# Patient Record
Sex: FEMALE | Race: BLACK OR AFRICAN AMERICAN | Employment: FULL TIME | URBAN - NONMETROPOLITAN AREA
[De-identification: names, ages, dates, MRNs, and addresses within clinical notes are randomized per-mention and may not be internally consistent; named-entity substitution may affect disease eponyms.]

---

## 2023-07-27 ENCOUNTER — HOSPITAL ENCOUNTER (INPATIENT)
Age: 43
LOS: 3 days | Discharge: HOME | DRG: 885 | End: 2023-07-30
Attending: PSYCHIATRY & NEUROLOGY | Admitting: PSYCHIATRY & NEUROLOGY

## 2023-07-27 DIAGNOSIS — F41.0 PANIC DISORDER: Primary | ICD-10-CM

## 2023-07-27 PROBLEM — F32.9 MAJOR DEPRESSIVE DISORDER, SINGLE EPISODE: Status: ACTIVE | Noted: 2023-07-27

## 2023-07-27 PROCEDURE — 6370000000 HC RX 637 (ALT 250 FOR IP): Performed by: PSYCHIATRY & NEUROLOGY

## 2023-07-27 PROCEDURE — 6360000002 HC RX W HCPCS: Performed by: PHYSICIAN ASSISTANT

## 2023-07-27 PROCEDURE — 1240000000 HC EMOTIONAL WELLNESS R&B

## 2023-07-27 PROCEDURE — 90792 PSYCH DIAG EVAL W/MED SRVCS: CPT | Performed by: PSYCHIATRY & NEUROLOGY

## 2023-07-27 PROCEDURE — 6370000000 HC RX 637 (ALT 250 FOR IP): Performed by: PHYSICIAN ASSISTANT

## 2023-07-27 RX ORDER — ONDANSETRON 4 MG/1
8 TABLET, ORALLY DISINTEGRATING ORAL EVERY 8 HOURS PRN
Status: DISCONTINUED | OUTPATIENT
Start: 2023-07-27 | End: 2023-07-30 | Stop reason: HOSPADM

## 2023-07-27 RX ORDER — IBUPROFEN 400 MG/1
400 TABLET ORAL EVERY 6 HOURS PRN
Status: DISCONTINUED | OUTPATIENT
Start: 2023-07-27 | End: 2023-07-30 | Stop reason: HOSPADM

## 2023-07-27 RX ORDER — BUSPIRONE HYDROCHLORIDE 15 MG/1
15 TABLET ORAL 3 TIMES DAILY
Status: ON HOLD | COMMUNITY
End: 2023-07-30 | Stop reason: HOSPADM

## 2023-07-27 RX ORDER — ACETAMINOPHEN 325 MG/1
650 TABLET ORAL EVERY 4 HOURS PRN
Status: DISCONTINUED | OUTPATIENT
Start: 2023-07-27 | End: 2023-07-30 | Stop reason: HOSPADM

## 2023-07-27 RX ORDER — MAGNESIUM HYDROXIDE/ALUMINUM HYDROXICE/SIMETHICONE 120; 1200; 1200 MG/30ML; MG/30ML; MG/30ML
30 SUSPENSION ORAL EVERY 6 HOURS PRN
Status: DISCONTINUED | OUTPATIENT
Start: 2023-07-27 | End: 2023-07-30 | Stop reason: HOSPADM

## 2023-07-27 RX ORDER — OXYCODONE HYDROCHLORIDE 5 MG/1
10 TABLET ORAL EVERY 6 HOURS PRN
Status: DISCONTINUED | OUTPATIENT
Start: 2023-07-27 | End: 2023-07-30 | Stop reason: HOSPADM

## 2023-07-27 RX ORDER — HYDROXYZINE HYDROCHLORIDE 25 MG/1
50 TABLET, FILM COATED ORAL 3 TIMES DAILY PRN
Status: DISCONTINUED | OUTPATIENT
Start: 2023-07-27 | End: 2023-07-30 | Stop reason: HOSPADM

## 2023-07-27 RX ORDER — PROMETHAZINE HYDROCHLORIDE 25 MG/ML
6.25 INJECTION, SOLUTION INTRAMUSCULAR; INTRAVENOUS EVERY 6 HOURS PRN
Status: DISCONTINUED | OUTPATIENT
Start: 2023-07-27 | End: 2023-07-30 | Stop reason: HOSPADM

## 2023-07-27 RX ORDER — BUPROPION HYDROCHLORIDE 150 MG/1
150 TABLET ORAL DAILY
Status: DISCONTINUED | OUTPATIENT
Start: 2023-07-27 | End: 2023-07-30 | Stop reason: HOSPADM

## 2023-07-27 RX ORDER — CLONAZEPAM 1 MG/1
1 TABLET ORAL EVERY 12 HOURS
Status: DISCONTINUED | OUTPATIENT
Start: 2023-07-27 | End: 2023-07-30 | Stop reason: HOSPADM

## 2023-07-27 RX ORDER — TRAZODONE HYDROCHLORIDE 50 MG/1
50 TABLET ORAL NIGHTLY PRN
Status: DISCONTINUED | OUTPATIENT
Start: 2023-07-27 | End: 2023-07-30 | Stop reason: HOSPADM

## 2023-07-27 RX ORDER — BUPRENORPHINE HYDROCHLORIDE 300 UG/1
300 FILM, SOLUBLE BUCCAL EVERY 12 HOURS
Status: ON HOLD | COMMUNITY
End: 2023-07-30 | Stop reason: HOSPADM

## 2023-07-27 RX ADMIN — OXYCODONE HYDROCHLORIDE 10 MG: 5 TABLET ORAL at 10:20

## 2023-07-27 RX ADMIN — PROMETHAZINE HYDROCHLORIDE 6.25 MG: 25 INJECTION INTRAMUSCULAR; INTRAVENOUS at 08:29

## 2023-07-27 RX ADMIN — CLONAZEPAM 1 MG: 1 TABLET ORAL at 10:20

## 2023-07-27 RX ADMIN — OXYCODONE HYDROCHLORIDE 10 MG: 5 TABLET ORAL at 21:44

## 2023-07-27 RX ADMIN — ONDANSETRON 8 MG: 4 TABLET, ORALLY DISINTEGRATING ORAL at 06:50

## 2023-07-27 RX ADMIN — CLONAZEPAM 1 MG: 1 TABLET ORAL at 21:44

## 2023-07-27 RX ADMIN — OXYCODONE HYDROCHLORIDE 10 MG: 5 TABLET ORAL at 16:43

## 2023-07-27 RX ADMIN — BUPROPION HYDROCHLORIDE 150 MG: 150 TABLET, FILM COATED, EXTENDED RELEASE ORAL at 10:20

## 2023-07-27 ASSESSMENT — PAIN DESCRIPTION - DESCRIPTORS
DESCRIPTORS: ACHING
DESCRIPTORS: ACHING

## 2023-07-27 ASSESSMENT — LIFESTYLE VARIABLES: HOW OFTEN DO YOU HAVE A DRINK CONTAINING ALCOHOL: NEVER

## 2023-07-27 ASSESSMENT — PAIN DESCRIPTION - LOCATION
LOCATION: BACK

## 2023-07-27 ASSESSMENT — PAIN DESCRIPTION - ORIENTATION
ORIENTATION: MID;LOWER
ORIENTATION: MID;LOWER

## 2023-07-27 ASSESSMENT — PAIN DESCRIPTION - PAIN TYPE
TYPE: CHRONIC PAIN
TYPE: CHRONIC PAIN

## 2023-07-27 ASSESSMENT — PAIN SCALES - GENERAL
PAINLEVEL_OUTOF10: 6
PAINLEVEL_OUTOF10: 7
PAINLEVEL_OUTOF10: 4
PAINLEVEL_OUTOF10: 3
PAINLEVEL_OUTOF10: 6

## 2023-07-27 ASSESSMENT — PAIN DESCRIPTION - ONSET: ONSET: GRADUAL

## 2023-07-27 ASSESSMENT — PAIN - FUNCTIONAL ASSESSMENT
PAIN_FUNCTIONAL_ASSESSMENT: ACTIVITIES ARE NOT PREVENTED
PAIN_FUNCTIONAL_ASSESSMENT: ACTIVITIES ARE NOT PREVENTED

## 2023-07-27 ASSESSMENT — PAIN DESCRIPTION - FREQUENCY: FREQUENCY: INTERMITTENT

## 2023-07-27 NOTE — BH NOTE
Behavioral Health   Admission Note   Admission Type: Involuntary    Reason for Admission: MDD    Patient Strengths/Barriers  Strengths (Must Choose Two): Support from family  Barriers: Support from family    Addictive Behavior  In the Past 3 Months, Have You Felt or Has Someone Told You That You Have a Problem With  : None    Medical Problems:   Past Medical History:   Diagnosis Date    Psychiatric problem        Status EXAM:  Mental Status and Behavioral Exam  Normal: No  Level of Assistance: Independent/Self  Facial Expression: Flat  Affect: Blunt  Level of Consciousness: Alert  Frequency of Checks: 4 times per hour, close  Mood:Normal: No  Mood: Anxious, Irritable  Eye Contact: Fair  Observed Behavior: Withdrawn  Sexual Misconduct History: Current - no  Preception: [place] to person, [place] to time, [place] to place, [place] to situation  Attention:Normal: Yes  Thought Processes: Circumstantial  Thought Content:Normal: Yes  Depression Symptoms: Appetite change, Change in energy level, Loss of interest, Isolative  Anxiety Symptoms: Generalized  Aleksandra Symptoms: No problems reported or observed. Hallucinations: None  Delusions: No  Memory:Normal: Yes  Insight and Judgment: No  Insight and Judgment: Poor judgment, Poor insight, Unmotivated    Pt admitted with followings belongings:  Dental Appliances: None  Vision - Corrective Lenses: None  Hearing Aid: None  Jewelry: Bracelet, Necklace, Earrings  Body Piercings Removed: N/A  Clothing: Footwear, Shirt, Shorts, Undergarments  Other Valuables: Purse, [address], Cigarettes     Admission order obtained Yes  . Patient's home medications were locked up #[id number]. Patient oriented to surroundings and program expectations and copy of patient rights given.  Received admission packet:  Yes  Consents reviewed, signed No. Outcomes Questionnaire completed {Responses; yes/no/refused/notapproropriate:No.  Jovan Panda Important Message from [employer] About Your Rights\" form reviewed, signed:

## 2023-07-27 NOTE — BH NOTE
Patient has had 1 large and 1 small emesis of yellow  bile. Patient states she has not eaten so she tried some maria crackers but that did not help.  Dr. Royer Tapia was called for orders to help with her nausea

## 2023-07-27 NOTE — PATIENT CARE CONFERENCE
[facility]  Initial Interdisciplinary Treatment Plan NOTE    REVIEW DATE AND TIME: 7/27/23 9:45    PATIENT was IN TREATMENT TEAM.  See Multidisciplinary Treatment Team sheet for participants. ADMISSION TYPE:   Admission Type: Involuntary    REASON FOR ADMISSION:  Reason for Admission: MDD      Estimated Length of Stay Update:  3-5 days  Estimated Discharge Date Update: 3-5 days    Patient Strengths/Barriers  Strengths (Must Choose Two): Support from family  Barriers: Support from family  Addictive Behavior:Addictive Behavior  In the Past 3 Months, Have You Felt or Has Someone Told You That You Have a Problem With  : None  Medical Problems:  Past Medical History:   Diagnosis Date    Psychiatric problem        EDUCATION:   Learner Progress Toward Treatment Goals: Reviewed results and recommendations of this team, Reviewed group plan and strategies, Reviewed signs, symptoms and risk of self harm and violent behavior, and Reviewed goals and plan of care    Method: Individual    Outcome: Verbalized understanding and Demonstrated Understanding    PATIENT GOALS: \"Helping me I guess. .. resources\". OQ TOP QUALITY PRIORITIES FOR THE PATIENT AS IDENTIFIED ON ADMISSION ADMINISTRATION:        ND    PLAN/TREATMENT RECOMMENDATIONS UPDATE:   What is the most important thing we can help you with while you are here? See above   Who is your support system? Friends  Do you have follow-up providers? Yes, Dr. Micehl Ramirez out of [place] (psychiatrist). Pt states she still should be able to see them virtually from [place]. Do you have the ability to pay for your medications? \"Not right now\"  Where will you be residing when you leave the hospital? Pt states her travel contract will provide housing in [place]. Will need a return to work slip or FMLA paper completion? Yes.       GOALS UPDATE:   Time frame for Short-Term Goals: Daily    GORDON Nowak

## 2023-07-27 NOTE — BH NOTE
INPATIENT RECREATIONAL THERAPY  ADULT BEHAVIORAL SERVICES  EVALUATION    REFERRING PHYSICIAN:   Dr. Jason Isbell  DIAGNOSIS:   Major Depressive Disorder, Single Episode  PRECAUTIONS:   Standard precautions    HISTORY OF PRESENT ILLNESS/INJURY:   Patient was admitted to the unit due to suicidal ideation and depression. Patient reported stress due to the [employer] truck she rented breaking down on her way from [place] to [place]. Patient stated that she went to [facility] for some help. Patient did not get the help she wanted so she left her children in the ED at [facility] and reported to the ED there she had taken a half of a bottle of buspar. Patient now denies all and is irritable about her admission to this unit. Patient reported that she ran out of her prescribed medications so she has been noncompliant. PMH:  Please see medical chart for prior medical history, allergies, and medication    HISTORY OF PSYCHIATRIC TREATMENT:  OP:  PCP    DATE OF BIRTH:   [birth year]  GENDER:   female  MARITAL STATUS:   with 2 teenaged daughters. Patient reported CPS took her children from her when she left them in the ED and took a walk to take the overdose. EMPLOYMENT STATUS:  Employed as an LPN. LIVING SITUATION/SUPPORT:  Patient was in [place] but on route back to [place] at this time. EDUCATIONAL LEVEL:  Patient has an LPN degree. MEDICATION/DRUG USE:  Noncompliance with medications    LEISURE INTERESTS:  spiritual activities, activities with her children/family, listening to music, activities with friends  ACTIVITY PREFERENCE:  Individual  ACTIVITY TYPES:  Passive. Indoor. Outdoor. Active. COGNITION:   A&Ox4    COPING:   poor  ATTENTION:  fair  RELAXATION:  Patient reported anxiety and a poor appetite.   SELF-ESTEEM:   poor  MOTIVATION:   poor - poor insight    SOCIAL SKILLS:  fair - isolating in her room  FRUSTRATION TOLERANCE:    poor   ATTENTION SEEKING:  isolating in her room  COOPERATION:

## 2023-07-27 NOTE — H&P
Department of Psychiatry  Attending Physician Psychiatric Assessment     Reason for Admission to Psychiatric Unit:  Threat to self requiring 24 hour professional observation  Concerns about patient's safety in the community    CHIEF COMPLAINT:  MDD with suicidal ideation    History obtained from:  patient, electronic medical record and family members    HISTORY OF PRESENT ILLNESS:    Rajwinder Soliman is a 44 y.o. female with significant past psychiatric history of MDD and anxiety who presented to [facility] ED and was placed on an [facility] for Overdose. Per Admission summary: Patient arrived to the unit on a cart by squad from [facility] under an [facility] that was obtained after the patient told a doctor in the ER at [facility] she had overdosed. Patient reports that she is originally from [place] but ha lived in [place] for the last 8 years, and is an LPN. States she has got a contract and was on her way back to [place] to work, when [place] broke down. Patient states she was at [facility] wanting help and was not getting any and she was getting frustrated so she left for a walk and left her kids in the ER (they are 15 and 16 and she stated they are old to take care of them selves). She states she took a few extra Buspar but not half the bottle but when she returned she told the doctor she had taken half the bottle, she was unaware that some sort of help had arrived but it was to late because the [facility] placed an [facility] on her for OD and CPS took her children. Patient denies that she was suicidal, states that she was needing help and no one would help. Patient is irritable, states she is not staying past the 3 days. Today on Interview,   Patient admitted to \"bad stuff going on\" in her life lately which led her to lie in order to obtain help from [facility] ED. She described living in [place] for the past 8 years on a nursing contract with her two daughters, 15and 16years old.  Recently, the roof caved in and the air conditioning broke
within normal limits

## 2023-07-28 PROBLEM — F33.2 MDD (MAJOR DEPRESSIVE DISORDER), RECURRENT SEVERE, WITHOUT PSYCHOSIS (HCC): Status: ACTIVE | Noted: 2023-07-27

## 2023-07-28 PROCEDURE — 99232 SBSQ HOSP IP/OBS MODERATE 35: CPT | Performed by: PSYCHIATRY & NEUROLOGY

## 2023-07-28 PROCEDURE — 6370000000 HC RX 637 (ALT 250 FOR IP): Performed by: PSYCHIATRY & NEUROLOGY

## 2023-07-28 PROCEDURE — 1240000000 HC EMOTIONAL WELLNESS R&B

## 2023-07-28 PROCEDURE — 6370000000 HC RX 637 (ALT 250 FOR IP): Performed by: PHYSICIAN ASSISTANT

## 2023-07-28 RX ADMIN — TRAZODONE HYDROCHLORIDE 50 MG: 50 TABLET ORAL at 20:25

## 2023-07-28 RX ADMIN — BUPROPION HYDROCHLORIDE 150 MG: 150 TABLET, FILM COATED, EXTENDED RELEASE ORAL at 08:39

## 2023-07-28 RX ADMIN — ACETAMINOPHEN 650 MG: 325 TABLET ORAL at 06:58

## 2023-07-28 RX ADMIN — CLONAZEPAM 1 MG: 1 TABLET ORAL at 20:25

## 2023-07-28 RX ADMIN — CLONAZEPAM 1 MG: 1 TABLET ORAL at 08:39

## 2023-07-28 RX ADMIN — OXYCODONE HYDROCHLORIDE 10 MG: 5 TABLET ORAL at 22:24

## 2023-07-28 RX ADMIN — OXYCODONE HYDROCHLORIDE 10 MG: 5 TABLET ORAL at 03:39

## 2023-07-28 RX ADMIN — ONDANSETRON 8 MG: 4 TABLET, ORALLY DISINTEGRATING ORAL at 08:56

## 2023-07-28 RX ADMIN — OXYCODONE HYDROCHLORIDE 10 MG: 5 TABLET ORAL at 16:19

## 2023-07-28 RX ADMIN — IBUPROFEN 400 MG: 400 TABLET, FILM COATED ORAL at 21:23

## 2023-07-28 RX ADMIN — IBUPROFEN 400 MG: 400 TABLET, FILM COATED ORAL at 13:57

## 2023-07-28 RX ADMIN — OXYCODONE HYDROCHLORIDE 10 MG: 5 TABLET ORAL at 09:42

## 2023-07-28 ASSESSMENT — PAIN SCALES - GENERAL
PAINLEVEL_OUTOF10: 5
PAINLEVEL_OUTOF10: 8
PAINLEVEL_OUTOF10: 7
PAINLEVEL_OUTOF10: 4
PAINLEVEL_OUTOF10: 3
PAINLEVEL_OUTOF10: 3
PAINLEVEL_OUTOF10: 8
PAINLEVEL_OUTOF10: 0
PAINLEVEL_OUTOF10: 5
PAINLEVEL_OUTOF10: 7
PAINLEVEL_OUTOF10: 6
PAINLEVEL_OUTOF10: 0

## 2023-07-28 ASSESSMENT — PAIN DESCRIPTION - FREQUENCY
FREQUENCY: INTERMITTENT

## 2023-07-28 ASSESSMENT — PAIN DESCRIPTION - ORIENTATION
ORIENTATION: RIGHT;LEFT
ORIENTATION: ANTERIOR
ORIENTATION: ANTERIOR
ORIENTATION: LOWER
ORIENTATION: UPPER

## 2023-07-28 ASSESSMENT — PAIN - FUNCTIONAL ASSESSMENT
PAIN_FUNCTIONAL_ASSESSMENT: ACTIVITIES ARE NOT PREVENTED

## 2023-07-28 ASSESSMENT — PAIN DESCRIPTION - DESCRIPTORS
DESCRIPTORS: ACHING
DESCRIPTORS: CRAMPING
DESCRIPTORS: ACHING

## 2023-07-28 ASSESSMENT — PAIN DESCRIPTION - LOCATION
LOCATION: BACK
LOCATION: ABDOMEN
LOCATION: SHOULDER
LOCATION: BACK
LOCATION: BACK
LOCATION: ABDOMEN
LOCATION: ABDOMEN

## 2023-07-28 ASSESSMENT — PAIN DESCRIPTION - PAIN TYPE
TYPE: ACUTE PAIN

## 2023-07-28 ASSESSMENT — PAIN DESCRIPTION - ONSET
ONSET: GRADUAL

## 2023-07-28 NOTE — BH NOTE
1930 - patient irritable with assessment, acting as though she was being \"bothered\". She became irritable when she asked to sit up because she was laying in bed and talking through her pillow, after she woke up. She sat up for her vital signs and then laid face down again. Patient stated that she is planning to go to [place] but states that is does not matter with who because she is not staying and she is not following with no one. Patient denies pain at this time, she is laying bed asking when she is going to her medication that was scheduled. Patient was informed that they are not due. 2022 - Patient at nurses station asking for her medication and informed that it Is still not time for her medications, patient upset but returned to bed. Patient is very dismissive of staff. 2144 - After being aroused multiple times for medication administration but did come out for her medication. Patient states her pain is only a 3 and she has low anxiety.

## 2023-07-28 NOTE — BH NOTE
PLAN OF CARE:     Start Time: 0900  End Time:  0930    Group Topic:  Daily Goals    Group Type:   Goal Group    Intervention/Goal:  To increase support and identify daily goals    Attendance:  attended group      Affect:    congruent    Behavior:  cooperative and pleasant    Response:  appropriate    Daily Goal:  \"Come to groups today. \"    Progress:   progressing to goal 5

## 2023-07-29 PROCEDURE — 6370000000 HC RX 637 (ALT 250 FOR IP): Performed by: PSYCHIATRY & NEUROLOGY

## 2023-07-29 PROCEDURE — 6370000000 HC RX 637 (ALT 250 FOR IP): Performed by: PHYSICIAN ASSISTANT

## 2023-07-29 PROCEDURE — 1240000000 HC EMOTIONAL WELLNESS R&B

## 2023-07-29 PROCEDURE — 99231 SBSQ HOSP IP/OBS SF/LOW 25: CPT | Performed by: PSYCHIATRY & NEUROLOGY

## 2023-07-29 RX ADMIN — OXYCODONE HYDROCHLORIDE 10 MG: 5 TABLET ORAL at 12:14

## 2023-07-29 RX ADMIN — OXYCODONE HYDROCHLORIDE 10 MG: 5 TABLET ORAL at 06:06

## 2023-07-29 RX ADMIN — HYDROXYZINE HYDROCHLORIDE 50 MG: 25 TABLET, FILM COATED ORAL at 05:27

## 2023-07-29 RX ADMIN — CLONAZEPAM 1 MG: 1 TABLET ORAL at 20:37

## 2023-07-29 RX ADMIN — TRAZODONE HYDROCHLORIDE 50 MG: 50 TABLET ORAL at 20:37

## 2023-07-29 RX ADMIN — CLONAZEPAM 1 MG: 1 TABLET ORAL at 09:21

## 2023-07-29 RX ADMIN — BUPROPION HYDROCHLORIDE 150 MG: 150 TABLET, FILM COATED, EXTENDED RELEASE ORAL at 09:21

## 2023-07-29 RX ADMIN — HYDROXYZINE HYDROCHLORIDE 50 MG: 25 TABLET, FILM COATED ORAL at 13:37

## 2023-07-29 RX ADMIN — IBUPROFEN 400 MG: 400 TABLET, FILM COATED ORAL at 21:19

## 2023-07-29 RX ADMIN — OXYCODONE HYDROCHLORIDE 10 MG: 5 TABLET ORAL at 18:07

## 2023-07-29 RX ADMIN — IBUPROFEN 400 MG: 400 TABLET, FILM COATED ORAL at 09:26

## 2023-07-29 ASSESSMENT — PAIN SCALES - GENERAL
PAINLEVEL_OUTOF10: 2
PAINLEVEL_OUTOF10: 7
PAINLEVEL_OUTOF10: 8
PAINLEVEL_OUTOF10: 0
PAINLEVEL_OUTOF10: 8
PAINLEVEL_OUTOF10: 4
PAINLEVEL_OUTOF10: 0
PAINLEVEL_OUTOF10: 7
PAINLEVEL_OUTOF10: 0
PAINLEVEL_OUTOF10: 4
PAINLEVEL_OUTOF10: 0

## 2023-07-29 ASSESSMENT — PAIN DESCRIPTION - ONSET
ONSET: ON-GOING
ONSET: ON-GOING

## 2023-07-29 ASSESSMENT — PAIN DESCRIPTION - LOCATION
LOCATION: ABDOMEN

## 2023-07-29 ASSESSMENT — PAIN DESCRIPTION - PAIN TYPE
TYPE: ACUTE PAIN
TYPE: ACUTE PAIN

## 2023-07-29 ASSESSMENT — PAIN DESCRIPTION - DESCRIPTORS
DESCRIPTORS: ACHING
DESCRIPTORS: CRAMPING;ACHING
DESCRIPTORS: CRAMPING
DESCRIPTORS: ACHING;CRAMPING
DESCRIPTORS: ACHING;CRAMPING

## 2023-07-29 ASSESSMENT — PAIN DESCRIPTION - FREQUENCY
FREQUENCY: INTERMITTENT
FREQUENCY: INTERMITTENT

## 2023-07-29 ASSESSMENT — PAIN - FUNCTIONAL ASSESSMENT
PAIN_FUNCTIONAL_ASSESSMENT: ACTIVITIES ARE NOT PREVENTED

## 2023-07-29 ASSESSMENT — PAIN DESCRIPTION - ORIENTATION
ORIENTATION: ANTERIOR
ORIENTATION: ANTERIOR
ORIENTATION: MID

## 2023-07-29 ASSESSMENT — PAIN SCALES - WONG BAKER
WONGBAKER_NUMERICALRESPONSE: 0
WONGBAKER_NUMERICALRESPONSE: 0

## 2023-07-29 NOTE — GROUP NOTE
Group Therapy Note    Date: 2023    Group Start Time: 1100  Group End Time:   Group Topic: [facility] Adult Psych 4E    Wilhelmena Medicus, CTRS        Group Therapy Note    Attendees: 6       Patient's Goal:  To keep coming to the groups. Notes:  Pt initially is standoffish in group and irritable. However, with increased conversation and prompting, pt is able to participate in group therapy conversation appropriately. With increased support and prompting, pt is able to identify an appropriate goal and engage in conversation with peers. Status After Intervention:  Improved    Participation Level:  Active Listener and Interactive    Participation Quality: Appropriate, Attentive, and Sharing      Speech:  normal and hesitant      Thought Process/Content: Logical      Affective Functioning: Congruent      Mood: euthymic      Level of consciousness:  Alert, Oriented x4, and Attentive      Response to Learning: Able to verbalize current knowledge/experience, Able to verbalize/acknowledge new learning, Able to retain information, and Progressing to goal      Endings: None Reported    Modes of Intervention: Education, Support, Socialization, Exploration, Clarifying, Activity, Limit-setting, and Reality-testing      Discipline Responsible: Psychoeducational Specialist      Signature:  Majel Apgar A Renard Bern

## 2023-07-29 NOTE — PATIENT CARE CONFERENCE
[facility]  Day 3 Interdisciplinary Treatment Plan NOTE    Review Date & Time: 7/29/23 1310    Patient was in treatment team    Admission Type:   Admission Type: Involuntary    Reason for admission:  Reason for Admission: MDD  Estimated Length of Stay Update:  1-2 days  Estimated Discharge Date Update: 7/30/23    Patient Strengths/Barriers  Strengths (Must Choose Two): Support from family  Barriers: Support from family  Addictive Behavior:Addictive Behavior  In the Past 3 Months, Have You Felt or Has Someone Told You That You Have a Problem With  : None  Medical Problems:  Past Medical History:   Diagnosis Date    Psychiatric problem        Risk:  Fall Risk   Terrell Scale Terrell Scale Score: 22    Status EXAM:   Mental Status and Behavioral Exam  Normal: No  Level of Assistance: Independent/Self  Facial Expression: Flat  Affect: Blunt  Level of Consciousness: Alert  Frequency of Checks: 4 times per hour, close  Mood:Normal: No  Mood: Depressed, Other (comment)  Motor Activity:Normal: Yes  Eye Contact: Good  Observed Behavior: Withdrawn  Sexual Misconduct History: Current - no  Preception: [place] to person, [place] to time, [place] to place, [place] to situation  Attention:Normal: No  Attention: Unable to concentrate  Thought Processes: Circumstantial  Thought Content:Normal: No  Thought Content: Preoccupations  Depression Symptoms: No problems reported or observed. Anxiety Symptoms: No problems reported or observed. Aleksandra Symptoms: No problems reported or observed.   Hallucinations: None  Delusions: No  Memory:Normal: No  Memory: Confabulation  Insight and Judgment: No  Insight and Judgment: Poor judgment, Poor insight    Daily Assessment Last Entry:   Daily Sleep (WDL): Within Defined Limits            Daily Nutrition (WDL): Within Defined Limits  Level of Assistance: Independent/Self    Patient Monitoring:  Frequency of Checks: 4 times per hour, close    Psychiatric Symptoms:   Depression

## 2023-07-29 NOTE — GROUP NOTE
Group Therapy Note    Date: 7/29/2023    Group Start Time: 1115  Group End Time: 1200  Group Topic: Recreational    [address] Adult Psych 4E    Alex Hernandez, GLORIA        Group Therapy Note    Attendees: 6       Patient's Goal:  Pt to identify Healthy vs Unhealthy coping strategies while participating in group handout and engaging in conversation with peers in group therapy. Notes:  Pt is engaging in group therapy conversation and completed handout with minimal prompts. Pt was able to identify healthy coping such as listening to music and practicing more self care. Pt was irritable at the beginning of group therapy session but was able to successfully participate in tasks and conversation with good insight. Status After Intervention:  Improved    Participation Level:  Active Listener and Interactive    Participation Quality: Appropriate, Attentive, Sharing, and Supportive      Speech:  normal      Thought Process/Content: Logical      Affective Functioning: Congruent      Mood: euthymic      Level of consciousness:  Alert, Oriented x4, and Attentive      Response to Learning: Able to verbalize current knowledge/experience, Able to verbalize/acknowledge new learning, Able to retain information, and Progressing to goal      Endings: None Reported    Modes of Intervention: Education, Support, Socialization, Exploration, Clarifying, Activity, Limit-setting, and Reality-testing      Discipline Responsible: Psychoeducational Specialist      Signature:  Martell Stephen

## 2023-07-30 VITALS
TEMPERATURE: 98 F | RESPIRATION RATE: 16 BRPM | OXYGEN SATURATION: 100 % | SYSTOLIC BLOOD PRESSURE: 95 MMHG | HEART RATE: 75 BPM | DIASTOLIC BLOOD PRESSURE: 68 MMHG

## 2023-07-30 PROBLEM — F33.2 MDD (MAJOR DEPRESSIVE DISORDER), RECURRENT SEVERE, WITHOUT PSYCHOSIS (HCC): Status: RESOLVED | Noted: 2023-07-27 | Resolved: 2023-07-30

## 2023-07-30 PROCEDURE — 6370000000 HC RX 637 (ALT 250 FOR IP): Performed by: PHYSICIAN ASSISTANT

## 2023-07-30 PROCEDURE — 6370000000 HC RX 637 (ALT 250 FOR IP): Performed by: PSYCHIATRY & NEUROLOGY

## 2023-07-30 PROCEDURE — 99238 HOSP IP/OBS DSCHRG MGMT 30/<: CPT | Performed by: PSYCHIATRY & NEUROLOGY

## 2023-07-30 PROCEDURE — 5130000000 HC BRIDGE APPOINTMENT

## 2023-07-30 RX ORDER — CLONAZEPAM 1 MG/1
1 TABLET ORAL EVERY 12 HOURS
Qty: 60 TABLET | Refills: 0 | Status: SHIPPED | OUTPATIENT
Start: 2023-07-30 | End: 2023-08-29

## 2023-07-30 RX ORDER — BUPROPION HYDROCHLORIDE 150 MG/1
150 TABLET ORAL DAILY
Qty: 30 TABLET | Refills: 0 | Status: SHIPPED | OUTPATIENT
Start: 2023-07-31

## 2023-07-30 RX ADMIN — BUPROPION HYDROCHLORIDE 150 MG: 150 TABLET, FILM COATED, EXTENDED RELEASE ORAL at 08:19

## 2023-07-30 RX ADMIN — IBUPROFEN 400 MG: 400 TABLET, FILM COATED ORAL at 10:44

## 2023-07-30 RX ADMIN — HYDROXYZINE HYDROCHLORIDE 50 MG: 25 TABLET, FILM COATED ORAL at 05:04

## 2023-07-30 RX ADMIN — CLONAZEPAM 1 MG: 1 TABLET ORAL at 08:19

## 2023-07-30 RX ADMIN — OXYCODONE HYDROCHLORIDE 10 MG: 5 TABLET ORAL at 00:12

## 2023-07-30 RX ADMIN — HYDROXYZINE HYDROCHLORIDE 50 MG: 25 TABLET, FILM COATED ORAL at 12:04

## 2023-07-30 RX ADMIN — IBUPROFEN 400 MG: 400 TABLET, FILM COATED ORAL at 05:04

## 2023-07-30 RX ADMIN — OXYCODONE HYDROCHLORIDE 10 MG: 5 TABLET ORAL at 12:04

## 2023-07-30 RX ADMIN — OXYCODONE HYDROCHLORIDE 10 MG: 5 TABLET ORAL at 06:14

## 2023-07-30 ASSESSMENT — PAIN SCALES - GENERAL
PAINLEVEL_OUTOF10: 0
PAINLEVEL_OUTOF10: 7
PAINLEVEL_OUTOF10: 0
PAINLEVEL_OUTOF10: 3
PAINLEVEL_OUTOF10: 0
PAINLEVEL_OUTOF10: 7
PAINLEVEL_OUTOF10: 4
PAINLEVEL_OUTOF10: 7

## 2023-07-30 ASSESSMENT — PAIN DESCRIPTION - FREQUENCY
FREQUENCY: INTERMITTENT

## 2023-07-30 ASSESSMENT — PAIN DESCRIPTION - ORIENTATION
ORIENTATION: MID
ORIENTATION: ANTERIOR
ORIENTATION: MID

## 2023-07-30 ASSESSMENT — PAIN DESCRIPTION - ONSET
ONSET: ON-GOING

## 2023-07-30 ASSESSMENT — PAIN DESCRIPTION - PAIN TYPE
TYPE: ACUTE PAIN

## 2023-07-30 ASSESSMENT — PAIN DESCRIPTION - LOCATION
LOCATION: ABDOMEN

## 2023-07-30 ASSESSMENT — PAIN DESCRIPTION - DESCRIPTORS
DESCRIPTORS: CRAMPING
DESCRIPTORS: CRAMPING
DESCRIPTORS: ACHING;CRAMPING
DESCRIPTORS: ACHING;CRAMPING
DESCRIPTORS: CRAMPING

## 2023-07-30 ASSESSMENT — PAIN - FUNCTIONAL ASSESSMENT
PAIN_FUNCTIONAL_ASSESSMENT: ACTIVITIES ARE NOT PREVENTED

## 2023-07-30 NOTE — PLAN OF CARE
Patient has attended one group so far today and has been out of her room at times this shift so she is progressing toward demonstrating effective coping strategies. Patient will be encouraged to attend all of the groups daily during the rest of her hospital stay.      Problem: Pain  Goal: Verbalizes/displays adequate comfort level or baseline comfort level  7/28/2023 0252 by Army Pita RN  Outcome: Not Progressing  Note: Patient reports 3/10 back, given PRN oxycodone that was effective     Problem: Depression  Goal: Will be euthymic at discharge  7/28/2023 0252 by Army Pita RN  Outcome: Not Progressing  Note: Patient states mood is 2/10 with low anxiety and depression
Patient has not attended any of the groups today and has not been out of her room to socialize with peers this shift so she is not progressing toward demonstrating effective coping strategies at this time. Patient will be encouraged to attend all groups on the unit daily and to come out of her room for social interaction with others during her hospital stay. Problem: Coping  Goal: Pt/Family able to verbalize concerns and demonstrate effective coping strategies  7/27/2023 1427 by Josafat Pierce  Outcome: Not Progressing  7/27/2023 1346 by Luis Bragg RN  Outcome: Progressing  Flowsheets (Taken 7/27/2023 0808)  Patient/family able to verbalize anxieties, fears, and concerns, and demonstrate effective coping: Provide emotional support, including active listening and acknowledgement of concerns of patient and caregivers  Note: Does not verbalized any coping skills.
Problem: Pain  Goal: Verbalizes/displays adequate comfort level or baseline comfort level  7/28/2023 1456 by Corina Palacios RN  Outcome: Progressing  Flowsheets (Taken 7/28/2023 1456)  Verbalizes/displays adequate comfort level or baseline comfort level:   Encourage patient to monitor pain and request assistance   Administer analgesics based on type and severity of pain and evaluate response   Assess pain using appropriate pain scale   Implement non-pharmacological measures as appropriate and evaluate response  Note: Patient takes oxycodone and motrin for pain voiced with relief     Problem: Self Harm/Suicidality  Goal: Will have no self-injury during hospital stay  Description: INTERVENTIONS:  1. Ensure constant observer at bedside with Q15M safety checks  2. Maintain a safe environment  3. Secure patient belongings  4. Ensure family/visitors adhere to safety recommendations  5. Ensure safety tray has been added to patient's diet order  6. Every shift and PRN: Re-assess suicidal risk via Frequent Screener    7/28/2023 1456 by Corina Palacios RN  Outcome: Progressing  Flowsheets (Taken 7/28/2023 1456)  Will have no self-injury during hospital stay:   Maintain a safe environment   Secure patient belongings  Note: Patient denies any desire to self harm     Problem: Depression  Goal: Will be euthymic at discharge  Description: INTERVENTIONS:  1. Administer medication as ordered  2. Provide emotional support via 1:1 interaction with staff  3. Encourage involvement in milieu/groups/activities  4. Monitor for social isolation  7/28/2023 1456 by Corina Palacios RN  Outcome: Progressing  Note: Monitored for untoward s/s     Problem: Behavior  Goal: Pt/Family maintain appropriate behavior and adhere to behavioral management agreement, if implemented  Description: INTERVENTIONS:  1. Assess patient/family's coping skills and  non-compliant behavior (including use of illegal substances)  2.  Notify security of
Problem: Pain  Goal: Verbalizes/displays adequate comfort level or baseline comfort level  7/29/2023 0355 by Salvatore Hummel RN  Outcome: Progressing  7/28/2023 1456 by Marquita Schulte RN  Outcome: Progressing  Flowsheets (Taken 7/28/2023 1456)  Verbalizes/displays adequate comfort level or baseline comfort level:   Encourage patient to monitor pain and request assistance   Administer analgesics based on type and severity of pain and evaluate response   Assess pain using appropriate pain scale   Implement non-pharmacological measures as appropriate and evaluate response  Note: Patient takes oxycodone and motrin for pain voiced with relief     Problem: Self Harm/Suicidality  Goal: Will have no self-injury during hospital stay  Description: INTERVENTIONS:  1. Ensure constant observer at bedside with Q15M safety checks  2. Maintain a safe environment  3. Secure patient belongings  4. Ensure family/visitors adhere to safety recommendations  5. Ensure safety tray has been added to patient's diet order  6. Every shift and PRN: Re-assess suicidal risk via Frequent Screener    7/29/2023 0355 by Salvatore Hummel RN  Outcome: Progressing  7/28/2023 1456 by Marquita Schulte RN  Outcome: Progressing  Flowsheets (Taken 7/28/2023 1456)  Will have no self-injury during hospital stay:   Maintain a safe environment   Secure patient belongings  Note: Patient denies any desire to self harm     Problem: Depression  Goal: Will be euthymic at discharge  Description: INTERVENTIONS:  1. Administer medication as ordered  2. Provide emotional support via 1:1 interaction with staff  3. Encourage involvement in milieu/groups/activities  4.  Monitor for social isolation  7/29/2023 0355 by Salvatore Hummel RN  Outcome: Progressing  7/28/2023 1456 by Marquita Schulte RN  Outcome: Progressing  Note: Monitored for untoward s/s     Problem: Behavior  Goal: Pt/Family maintain appropriate behavior and adhere to behavioral management agreement, if
Problem: Pain  Goal: Verbalizes/displays adequate comfort level or baseline comfort level  7/30/2023 0925 by Alejandro Wu RN  Outcome: Progressing  Flowsheets (Taken 7/28/2023 1456 by Albert Kendrick, RN)  Verbalizes/displays adequate comfort level or baseline comfort level:   Encourage patient to monitor pain and request assistance   Administer analgesics based on type and severity of pain and evaluate response   Assess pain using appropriate pain scale   Implement non-pharmacological measures as appropriate and evaluate response  Note: Patient denies pain at time of assessment, patient medicated prior to shift. Problem: Self Harm/Suicidality  Goal: Will have no self-injury during hospital stay  Description: INTERVENTIONS:  1. Ensure constant observer at bedside with Q15M safety checks  2. Maintain a safe environment  3. Secure patient belongings  4. Ensure family/visitors adhere to safety recommendations  5. Ensure safety tray has been added to patient's diet order  6. Every shift and PRN: Re-assess suicidal risk via Frequent Screener    7/30/2023 0925 by Alejandro Wu RN  Outcome: Progressing  Flowsheets (Taken 7/28/2023 1456 by Albert Kendrick RN)  Will have no self-injury during hospital stay:   Maintain a safe environment   Secure patient belongings  Note: Patient denies thoughts of self harm at this time, 15 minute safety checks in place. Problem: Depression  Goal: Will be euthymic at discharge  Description: INTERVENTIONS:  1. Administer medication as ordered  2. Provide emotional support via 1:1 interaction with staff  3. Encourage involvement in milieu/groups/activities  4. Monitor for social isolation  7/30/2023 0925 by Alejandro Wu RN  Outcome: Progressing  Note: Ongoing, patient currently rates mood as a 7/10. Problem: Behavior  Goal: Pt/Family maintain appropriate behavior and adhere to behavioral management agreement, if implemented  Description: INTERVENTIONS:  1.  Assess
Problem: Pain  Goal: Verbalizes/displays adequate comfort level or baseline comfort level  Outcome: Progressing  Flowsheets (Taken 7/27/2023 0808)  Verbalizes/displays adequate comfort level or baseline comfort level: Assess pain using appropriate pain scale  Note: Reports having back pain and rates the pain 6 out of 10 with 10 being the worse pain. Problem: Self Harm/Suicidality  Goal: Will have no self-injury during hospital stay  Description: INTERVENTIONS:  1. Ensure constant observer at bedside with Q15M safety checks  2. Maintain a safe environment  3. Secure patient belongings  4. Ensure family/visitors adhere to safety recommendations  5. Ensure safety tray has been added to patient's diet order  6. Every shift and PRN: Re-assess suicidal risk via Frequent Screener    Outcome: Progressing  Flowsheets (Taken 7/27/2023 0808)  Will have no self-injury during hospital stay:   Maintain a safe environment   Secure patient belongings  Note: Free from self harming behaviors. Denies suicidal thoughts. Problem: Depression  Goal: Will be euthymic at discharge  Description: INTERVENTIONS:  1. Administer medication as ordered  2. Provide emotional support via 1:1 interaction with staff  3. Encourage involvement in milieu/groups/activities  4. Monitor for social isolation  Outcome: Progressing  Note: Reports having anxiety. Problem: Behavior  Goal: Pt/Family maintain appropriate behavior and adhere to behavioral management agreement, if implemented  Description: INTERVENTIONS:  1. Assess patient/family's coping skills and  non-compliant behavior (including use of illegal substances)  2. Notify security of behavior or suspected illegal substances which indicate the need for search of the family and/or belongings  3. Encourage verbalization of thoughts and concerns in a socially appropriate manner  4.  Utilize positive, consistent limit setting strategies supporting safety of patient, staff and
Problem: Pain  Goal: Verbalizes/displays adequate comfort level or baseline comfort level  Outcome: Progressing  Flowsheets (Taken 7/28/2023 1456 by Iban Kaur RN)  Verbalizes/displays adequate comfort level or baseline comfort level:   Encourage patient to monitor pain and request assistance   Administer analgesics based on type and severity of pain and evaluate response   Assess pain using appropriate pain scale   Implement non-pharmacological measures as appropriate and evaluate response     Problem: Self Harm/Suicidality  Goal: Will have no self-injury during hospital stay  Description: INTERVENTIONS:  1. Ensure constant observer at bedside with Q15M safety checks  2. Maintain a safe environment  3. Secure patient belongings  4. Ensure family/visitors adhere to safety recommendations  5. Ensure safety tray has been added to patient's diet order  6. Every shift and PRN: Re-assess suicidal risk via Frequent Screener    Outcome: Progressing  Flowsheets (Taken 7/28/2023 1456 by Iban Kaur RN)  Will have no self-injury during hospital stay:   Maintain a safe environment   Secure patient belongings     Problem: Depression  Goal: Will be euthymic at discharge  Description: INTERVENTIONS:  1. Administer medication as ordered  2. Provide emotional support via 1:1 interaction with staff  3. Encourage involvement in milieu/groups/activities  4. Monitor for social isolation  Outcome: Progressing     Problem: Behavior  Goal: Pt/Family maintain appropriate behavior and adhere to behavioral management agreement, if implemented  Description: INTERVENTIONS:  1. Assess patient/family's coping skills and  non-compliant behavior (including use of illegal substances)  2. Notify security of behavior or suspected illegal substances which indicate the need for search of the family and/or belongings  3. Encourage verbalization of thoughts and concerns in a socially appropriate manner  4.  Utilize positive, consistent
non-compliant behavior (including use of illegal substances)  2. Notify security of behavior or suspected illegal substances which indicate the need for search of the family and/or belongings  3. Encourage verbalization of thoughts and concerns in a socially appropriate manner  4. Utilize positive, consistent limit setting strategies supporting safety of patient, staff and others  5. Encourage participation in the decision making process about the behavioral management agreement  6. If a visitor's behavior poses a threat to safety call refer to organization policy. 7. Initiate consult with , Psychosocial CNS, Spiritual Care as appropriate  7/29/2023 0900 by Dave Jacobson RN  Outcome: Progressing  [address] (Taken 7/28/2023 1456 by Khushbu Almendarez, RN)  Patient/family maintains appropriate behavior and adheres to behavioral management agreement, if implemented:   Utilize positive, consistent limit setting strategies supporting safety of patient, staff and others   Encourage verbalization of thoughts and concerns in a socially appropriate manner  Note: Patient declined vitals and most of morning assessment, states \"I'm tired\"     Problem: Anxiety  Goal: Will report anxiety at manageable levels  Description: INTERVENTIONS:  1. Administer medication as ordered  2. Teach and rehearse alternative coping skills  3. Provide emotional support with 1:1 interaction with staff  7/29/2023 0900 by Dave Jacobson RN  Outcome: Progressing  Flowsheets (Taken 7/28/2023 1456 by Khushbu Almendarez, RN)  Will report anxiety at manageable levels:   Administer medication as ordered   Teach and rehearse alternative coping skills  Note: Patient denies anxiety at this time. Problem: Involuntary Admit  Goal: Will cooperate with staff recommendations and doctor's orders and will demonstrate appropriate behavior  Description: INTERVENTIONS:  1. Treat underlying conditions and offer medication as ordered  2.  Educate regarding
concerns of patient and caregivers  3. Reduce environmental stimuli, as able  4. Instruct patient/family in relaxation techniques, as appropriate  5. Assess for spiritual pain/suffering and initiate Spiritual Care, Psychosocial Clinical Specialist consults as needed  7/28/2023 0252 by Bernice Peterson RN  Outcome: Progressing  Note: Patient isolates to bed and sleeps throughout the day  7/27/2023 1427 by Josafat Pierce  Outcome: Not Progressing  7/27/2023 1346 by Luis Bragg RN  Outcome: Progressing  Flowsheets (Taken 7/27/2023 0808)  Patient/family able to verbalize anxieties, fears, and concerns, and demonstrate effective coping: Provide emotional support, including active listening and acknowledgement of concerns of patient and caregivers  Note: Does not verbalized any coping skills. Care plan reviewed with patient and verbalize understanding of the plan of care and contribute to goal setting.

## 2023-07-30 NOTE — DISCHARGE INSTR - DIET
Good nutrition is important when healing from an illness, injury, or surgery. Follow any nutrition recommendations given to you during your hospital stay. If you were given an oral nutrition supplement while in the hospital, continue to take this supplement at home. You can take it with meals, in-between meals, and/or before bedtime. These supplements can be purchased at most local grocery stores, pharmacies, and chain [employer]-stores. If you have any questions about your diet or nutrition, call the hospital and ask for the dietitian.   Resume regular diet

## 2023-07-30 NOTE — DISCHARGE INSTRUCTIONS
[employer] Hotline:  [phone]    Crisis phone numbers:  Jeb Blank, and [facility] [phone]. Dominique Sermina, and [place] [address] [facility] [phone]. Jose and [facility] [phone]. [address] [facility]. Rayshawn Chase [place], and [facility] [phone]. [facility], [address]  [address]  [address] [place] [facility]  [address]  [address]  [facility] [address] [facility]  [address] [facility] [address]    [facility]  [address]  [facility]  [address] [facility] [address] [facility] [address] [facility] and [facility]  [address] [facility], [address] [facility],[address]  [phone]    [facility]  [address]  [address], [address]  [address]  [address]  [place], [address] [facility]  [facility]  [address]  [facility]  [address] [facility],[address], [address] [facility]  Recovery and [facility] [address] [facility]  [phone]    [facility]  [address] [facility],Manjinder B [address] [facility], [address]  [address]  [address], [id number]   [facility], [address]  [address]  [address] [facility], [address]  [address] [facility]  [address], [address] [facility] [address]  [address]  [address] [facility], [address]  [phone]

## 2023-07-30 NOTE — PROGRESS NOTES
24 hour chart review completed
24 hour chart review completed
BH Psychosocial Assessment    Current Level of Psychosocial Functioning     Independent XX  Dependent    Minimal Assist     Comments:      Psychosocial High Risk Factors (check all that apply)    Unable to obtain meds   Chronic illness/pain    Substance abuse   Lack of Family Support   Financial stress   Isolation   Inadequate Community Resources  Suicide attempt(s) XX  Not taking medications   Victim of crime   Developmental Delay  Unable to manage personal needs    Age 72 or older   Homeless  No transportation   Readmission within 30 days  Unemployment   Traumatic Event XX    Family/Supports identified: Friends     Sexual Orientation:  Heterosexual      Patient Strengths: Employment     Patient Barriers: Not from this area, risk of elopement. Safety plan: On-going, Q15 minute safety checks    CMHC/MH history: See clinical summary for details    Plan of Care:  medication management, group/individual therapies, family meetings, psycho -education, treatment team meetings to assist with stabilization    Initial Discharge Plan:  : Patient will return to [place] and follow-up with her psychiatrist based out of [place]. Clinical Summary:      Per the nursing admission note: \". .. under an [address] that was obtained after the patient told a doctor in the ER at [facility] she had overdosed. Patient reports that she is originally from [place] but ha lived in [place] for the last 8 years, and is an LPN. States she has got a contract and was on her way back to [place] to work, when [employer] broke down. Patient states she was at [facility] wanting help and was not getting any and she was getting frustrated so she left for a walk and left her kids in the ER (they are 15 and 16 and she stated they are old to take care of them selves).  She states she took a few extra Buspar but not half the bottle but when she returned she told the doctor she had taken half the bottle, she was unaware
Behavioral Health   Discharge Note    Pt discharged with followings belongings:   Dental Appliances: None  Vision - Corrective Lenses: None  Hearing Aid: None  Jewelry: Bracelet, Necklace, Earrings  Body Piercings Removed: N/A  Clothing: Footwear, Shirt, Shorts, Undergarments  Other Valuables: Marylen Rossetti, Gurinder Herbert, Cigarettes   Valuables retrieved from safe, security envelope number:  [id number] and returned to patient. Patient left department with belongings via ambulatory. Discharged to home. \"An Important Message from Medicare About Your Rights\" (IMM) form photocopy original from admission and provided to pt at least 4 hours prior to discharge N/A. If pt left within 4 hours of receiving 2nd delivery of IMM, this is because pt was agreeable with hospital discharge. Patient/guardian education on aftercare instructions: Yes  Bridge appointment completed:  yes. Reviewed Discharge Instructions with patient/family/nursing facility. Patient/family verbalizes understanding and agreement with the discharge plan using the teachback method. Patient/family verbalize understanding of AVS:Yes    Status EXAM upon discharge:  Mental Status and Behavioral Exam  Normal: No  Level of Assistance: Independent/Self  Facial Expression: Flat  Affect: Blunt  Level of Consciousness: Alert  Frequency of Checks: 4 times per hour, close  Mood:Normal: No  Mood: Depressed  Motor Activity:Normal: Yes  Eye Contact: Fair  Observed Behavior: Withdrawn  Sexual Misconduct History: Current - no  Preception: Paul to person, Paul to time, Paul to place, Paul to situation  Attention:Normal: No  Attention: Unable to concentrate  Thought Processes: Circumstantial  Thought Content:Normal: No  Thought Content: Preoccupations  Depression Symptoms: No problems reported or observed. Anxiety Symptoms: No problems reported or observed. Aleksandra Symptoms: No problems reported or observed.   Hallucinations: None  Delusions: No  Memory:Normal: No  Memory:
Behavioral Services  Medicare Certification Upon Admission    I certify that this patient's inpatient psychiatric hospital admission is medically necessary for:    [x] (1) Treatment which could reasonably be expected to improve this patient's condition,       [x] (2) Or for diagnostic study;     AND     [x](2) The inpatient psychiatric services are provided while the individual is under the care of a physician and are included in the individualized plan of care.     Estimated length of stay/service 3-5 days    Plan for post-hospital care hc    Electronically signed by Diego Bean MD on 7/27/2023 at 7:41 AM
Did not attend evening groups
Did not attend evening groups
Discharge planning- Patient requested to speak with . Patient has questions regarding her children. Informed patient that we are not able to share information about her children with her at this point. Patient stated upon discharge, she will be living in [place] with a friend from [facility]. Inez rG is to go to [facility] during their open access times Monday, Wednesday, Friday from 9:30 am - 11:30 am; Tuesday & Thursday from 2:30 pm - 3:30 pm to begin services.
Discharge planning- Patient states her psychiatrist's name is Dr. Luisa Mccollum in [place], [place]. Patient is unable to provide any information on her psychiatrist, instead states \"just look him up\". Attempted to call Dr. Surendra Wolf in [place], [place] at [phone] to schedule follow up appointment for patient. Left voicemail for return call.
Group Therapy Note    Date: 7/29/2023  Start Time: 1330  End Time:  1430  Number of Participants: 9    Type of Group: Psychotherapy    Notes:  Patient was present in group with active participation. Group focused on the topic of control. Group members discussed feelings associated with not being in control. Group members also discussed methods in breaking cycles. Patient provided minimal information. At times, patient took over conversation. Status After Intervention:  Unchanged    Participation Level:  Active Listener and Interactive    Participation Quality: Appropriate, Attentive, Sharing, and Supportive      Speech:  normal      Thought Process/Content: Logical  Linear      Affective Functioning: Congruent      Mood: anxious      Level of consciousness:  Alert, Oriented x4, and Attentive      Response to Learning: Able to retain information and Capable of insight      Endings: None Reported    Modes of Intervention: Education, Support, Socialization, Exploration, Clarifying, and Problem-solving      Discipline Responsible: /Counselor      Signature:  GORDON Mejias
I spoke with pt. Pt is concerned and upset about not knowing where her children are. Pt is aware that her one daughter was in the ED for an evaluation. Pt stated that this daughter does have behavioral problems sometimes. I asked if pt would like me to call CPS. Pt was in agreement. I called [facility] CPS however the office is closed for the day.
Patient is allowed to have cell phone for the purpose of getting phone numbers out of phone. Patient initiates call from her personal m-spatial messenger. Patient asked to end call. Patient ignores writer. Patient is asked again by Killian Nayak  and educated that it would only take an accidental tap of the video button and everyone's HIPPA would be compromised. Patient states  \"No it won't. I'm a nurse. I know better. I know what I'm doing. \" Patient educated again that this is unit policy and following policy is conditional to being allowed to get numbers from her phone. Patient begins screaming at writer, calling writer a \"fat bitch\" and \"fat cunt\" multiple times and telling writer to [employer] the fuck up\" multiple times. Patient then tells unit MHT to take her phone \"Before I throw it at that cunt\" (writer). Threatens writer at this time stating [employer]" and throwing arms up in a challenge. Phone placed back in patient belongings. PA notified.
Patient yelling out \"nurse\" from her bed. Patient requesting her \"meds. \" Patient was given her pain medication and patient chewed the pills up.
Psychotherapy group 1330- Pt did not attend group.
Psychotherapy group at 1330- Patient did not attend group.
Daily  clonazePAM (KLONOPIN) tablet 1 mg, 1 mg, Oral, Q12H    ASSESSMENT AND PLAN    Major Depressive Disorder, severe single episode    Will continue to adjust medications accordingly.  Supportive therapy provided
report has been created using voice recognition software. It may contain minor errors which are inherent in voice recognition technology. **

## 2023-07-30 NOTE — DISCHARGE SUMMARY
Physician Discharge Summary     Patient ID:  Chelsey Carvajal  [id number]  44 y.o.  1980    Admit date: 7/27/2023    Discharge date and time: No discharge date for patient encounter. Admitting Physician: Lawson Sahu MD     Discharge Physician: Umm Calles MD    Admission Diagnoses: Major depressive disorder, single episode [F32.9]    Discharge Diagnoses: same above    Admission Condition: poor    Discharged Condition: good    Indication for Admission: Suicidal ideation    Hospital Course: Admitted to psych unit. Started on wellbutrin XL and klonopin . Her mood improved and later denied any suicidal ideation    Consults: none    Significant Diagnostic Studies: labs    Outstanding Order Results       No orders found from 6/28/2023 to 7/28/2023.             Treatments: antidepressants    Discharge Exam:  Level of consciousness:  Within normal limits  Appearance: Hospital attire, seated in bed, with good grooming and hygiene   Behavior/Motor: No abnormalities noted  Attitude toward examiner:  Cooperative, attentive, good eye contact  Speech:  spontaneous, normal rate, normal volume and well articulated  Mood:  euthymic  Affect: mood congruent  Thought processes:  linear, goal directed and coherent  Thought content:  denies homicidal ideation  Suicidal Ideation: Denies suicidal ideation  Delusions:  no evidence of delusions  Perceptual Disturbance:  denies any perceptual disturbance  Cognition:  Oriented to self, location, time, and situation  Memory: age appropriate  Insight & Judgement: improving    Disposition: home    Patient Instructions:   [unfilled]  Activity: activity as tolerated  Diet: regular diet  Wound Care: none needed    Follow-up with Outpatient psychiatrist in 4 weeks      Time spent 25 minutes      Signed:  Umm Calles MD  7/30/2023  11:14 AM

## 2023-08-01 ENCOUNTER — TELEPHONE (OUTPATIENT)
Dept: PSYCHIATRY | Age: 43
End: 2023-08-01

## 2023-08-01 NOTE — TELEPHONE ENCOUNTER
Follow up call to patient to evaluate if they had questions related to the recent stay or discharge. Patient did not answer at the number provided and no voicemail could be left at this number.